# Patient Record
Sex: FEMALE | Race: WHITE
[De-identification: names, ages, dates, MRNs, and addresses within clinical notes are randomized per-mention and may not be internally consistent; named-entity substitution may affect disease eponyms.]

---

## 2019-07-24 ENCOUNTER — HOSPITAL ENCOUNTER (EMERGENCY)
Dept: HOSPITAL 97 - ER | Age: 24
Discharge: HOME | End: 2019-07-24
Payer: OTHER GOVERNMENT

## 2019-07-24 DIAGNOSIS — M54.5: Primary | ICD-10-CM

## 2019-07-24 DIAGNOSIS — Z91.040: ICD-10-CM

## 2019-07-24 DIAGNOSIS — Z88.2: ICD-10-CM

## 2019-07-24 PROCEDURE — 72100 X-RAY EXAM L-S SPINE 2/3 VWS: CPT

## 2019-07-24 PROCEDURE — 81003 URINALYSIS AUTO W/O SCOPE: CPT

## 2019-07-24 PROCEDURE — 99284 EMERGENCY DEPT VISIT MOD MDM: CPT

## 2019-07-24 PROCEDURE — 81025 URINE PREGNANCY TEST: CPT

## 2019-07-24 NOTE — ER
Nurse's Notes                                                                                     

 [facility]                                                                 

Name: Nancy Mtz                                                                                

Age: 24 yrs                                                                                       

Sex: Female                                                                                       

: 1995                                                                                   

MRN: [id number]                                                                                   

Arrival Date: 2019                                                                          

Time: 20:06                                                                                       

Account#: [id number]                                                                            

Bed 20                                                                                            

Private MD:                                                                                       

Diagnosis: Low back pain                                                                          

                                                                                                  

Presentation:                                                                                     

                                                                                             

20:03 Presenting complaint: Patient states: that she was lifting weights (30#) at 1300 today. fc  

      Now is unable to move due to severe back pain. Transition of care: patient was not          

      received from another setting of care. Onset of symptoms was 2019. Risk            

      Assessment: Do you want to hurt yourself or someone else? Patient reports no desire to      

      harm self or others. Initial Sepsis Screen: Does the patient meet any 2 criteria? No.       

      Patient's initial sepsis screen is negative. Does the patient have a suspected source       

      of infection? No. Patient's initial sepsis screen is negative. Care prior to arrival:       

      None.                                                                                       

20:03 Method Of Arrival: EMS: [facility]                                                  

20:03 Acuity: ADOLFO 3                                                                           fc  

                                                                                                  

OB/GYN:                                                                                           

20:03 LMP N/A - Birth control method                                                          fc  

                                                                                                  

Historical:                                                                                       

- Allergies:                                                                                      

20:10 Latex, Natural Rubber;                                                                  fc  

20:10 Sulfa (Sulfonamide Antibiotics);                                                        fc  

- Home Meds:                                                                                      

20:10 multivitamin oral tab daily [Active];                                                   fc  

- PMHx:                                                                                           

20:10 Seizures; Disc Herniation;                                                              fc  

- PSHx:                                                                                           

20:10 None;                                                                                   fc  

                                                                                                  

- Immunization history:: Last tetanus immunization: up to date.                                   

- Social history:: Smoking status: Patient/guardian denies using tobacco,                         

  Patient/guardian denies using alcohol, street drugs.                                            

- Ebola Screening: : Patient negative for fever greater than or equal to 101.5 degrees            

  Fahrenheit, and additional compatible Ebola Virus Disease symptoms Patient denies               

  exposure to infectious person Patient denies travel to an Ebola-affected area in the            

  21 days before illness onset.                                                                   

                                                                                                  

                                                                                                  

Screenin:09 Abuse screen: Denies threats or abuse. Nutritional screening: No deficits noted.        fc  

      Tuberculosis screening: No symptoms or risk factors identified. Fall Risk None              

      identified.                                                                                 

                                                                                                  

Assessment:                                                                                       

20:18 General: Appears in no apparent distress. Behavior is calm, cooperative. Pain:          ak1 

      Complains of pain in coccyx, left lower back and right lower back. Neuro: Level of          

      Consciousness is awake, alert, obeys commands, Oriented to person, place, time.             

      Cardiovascular: No deficits noted. Respiratory: No deficits noted. GI: No signs and/or      

      symptoms were reported involving the gastrointestinal system. : No signs and/or           

      symptoms were reported regarding the genitourinary system. EENT: No signs and/or            

      symptoms were reported regarding the EENT system. Derm: No signs and/or symptoms            

      reported regarding the dermatologic system. Musculoskeletal: Range of motion: pt able       

      to move bilateral legs and bend both legs Reports pain in coccyx, left lower back and       

      right lower back since today after lifting 30 pound weights.                                

20:47 Reassessment: Patient appears in no apparent distress at this time. Patient and/or      ak1 

      family updated on plan of care and expected duration. Pain level reassessed. Patient is     

      alert, oriented x 3, equal unlabored respirations, skin warm/dry/pink. pt ambulated to      

      restroom with steady gait. Patient states feeling better. Patient states symptoms have      

      improved.                                                                                   

22:00 Reassessment: Patient appears in no apparent distress at this time. Patient and/or      jb4 

      family updated on plan of care and expected duration. Pain level reassessed. Patient is     

      alert, oriented x 3, equal unlabored respirations, skin warm/dry/pink.                      

22:50 Reassessment: Patient appears in no apparent distress at this time. Patient and/or      jb4 

      family updated on plan of care and expected duration. Pain level reassessed. Patient is     

      alert, oriented x 3, equal unlabored respirations, skin warm/dry/pink. Pt verbalized        

      understanding of d/c and follow up instructions.                                            

                                                                                                  

Vital Signs:                                                                                      

20:03  / 71; Pulse 84; Resp 16; Temp 99.2(O); Pulse Ox 100% on R/A; Weight 65.77 kg     fc  

      (R); Height 5 ft. 10 in. (177.80 cm) (R); Pain 5/10;                                        

20:52  / 71; Pulse 80; Resp 16; Temp 98.9; Pulse Ox 100% on R/A;                        ak1 

22:27  / 61; Pulse 72; Resp 16; Pulse Ox 100% on R/A;                                   jb4 

20:03 Body Mass Index 20.81 (65.77 kg, 177.80 cm)                                               

                                                                                                  

ED Course:                                                                                        

20:03 Arm band placed on Patient placed in an exam room, on a stretcher.                      fc  

20:06 Patient arrived in ED.                                                                  fc  

20:07 Anthony Avelar PA is PHCP.                                                                cp  

20:07 Anthony Balderrama MD is Attending Physician.                                             cp  

20:08 Dawna Wadsworth, RN is Primary Nurse.                                                     ak1 

20:08 Triage completed.                                                                       fc  

20:09 Patient has correct armband on for positive identification. Bed in low position. Call   fc  

      light in reach. Side rails up X2. Pulse ox on. NIBP on.                                     

20:09 No provider procedures requiring assistance completed.                                  fc  

20:48 Patient did not have IV access during this emergency room visit.                        ak1 

21:41 XRAY Lumbar Spine (3 Views) In Process Unspecified.                                     EDMS

22:52 Primary Nurse role handed off by Dawna Wadsworth RN                                      jb4 

22:52 Masoud Neville, RN is Primary Nurse.                                                     jb4 

                                                                                                  

Administered Medications:                                                                         

20:30 Not Given (Patient Refused): morphine 4 mg IM once                                      ak1 

20:45 Not Given (Patient Refused): Ketorolac 60 mg IM once                                    ak1 

20:47 Drug: Tylenol 1000 mg Route: PO;                                                        ak1 

21:10 Follow up: Response: No adverse reaction; Pain is decreased                             jb4 

20:47 Drug: Motrin 800 mg Route: PO;                                                          ak1 

21:10 Follow up: Response: No adverse reaction; Pain is decreased                             jb4 

                                                                                                  

                                                                                                  

Outcome:                                                                                          

22:24 Discharge ordered by MD.                                                                cp  

22:50 Discharged to home ambulatory, with family.                                             jb4 

22:50 Condition: stable                                                                           

22:50 Discharge instructions given to patient, significant other, Instructed on discharge         

      instructions, follow up and referral plans. medication usage, Demonstrated                  

      understanding of instructions, follow-up care, medications, Prescriptions given X 1.        

22:52 Patient left the ED.                                                                    jb4 

                                                                                                  

Signatures:                                                                                       

Dispatcher [employer]                           EDMS                                                 

Angelina Graves RN RN                                                      

Dawna Wadsworth, RN                       RN   ak                                                  

Anthony Avelar PA PA   cp                                                   

Masoud Neville, RN                       RN   jb4                                                  

                                                                                                  

**************************************************************************************************

## 2019-07-25 NOTE — RAD REPORT
EXAM DESCRIPTION:  RAD - Lumbar Spine 3 Views - 7/24/2019 9:47 pm

 

CLINICAL HISTORY:  Weight lifting accident, acute onset back pain

 

COMPARISON:  None.

 

FINDINGS:  A three-view lumbar spine examination was performed.

 

Approximately 20% compression fracture deformity involves the superior aspect L1 body. This wedge com
pression shows preservation of posterior wall height. No significant encroachment into the central ca
nal identifiable. No lytic, sclerotic or expansile component. Age of the compression fracture is un k
nown. No comparison imaging or relevant history available.

 

Remaining lumbar bodies are normal in height and alignment. No other evidence for acute or chronic fr
acture change. No abnormal disc space narrowing present. L5-S1 disc space is narrowed due to sacral i
s aeration of L5. Remaining disc spaces are normal. No facet joint suspicious finding. No pars defect
s identified.

 

IUD is in place only partially imaged.

 

IMPRESSION:  Approximately 20% wedge compression fracture of the L1 body with age unknown.

 

If there is no history of fracture, follow-up MR imaging could be performed to assess for active nano
ow edema, disc herniation or other acute component.